# Patient Record
Sex: MALE | Race: WHITE | NOT HISPANIC OR LATINO | Employment: OTHER | URBAN - METROPOLITAN AREA
[De-identification: names, ages, dates, MRNs, and addresses within clinical notes are randomized per-mention and may not be internally consistent; named-entity substitution may affect disease eponyms.]

---

## 2017-02-09 ENCOUNTER — NEW PATIENT (OUTPATIENT)
Dept: URBAN - METROPOLITAN AREA CLINIC 26 | Facility: CLINIC | Age: 77
End: 2017-02-09

## 2017-02-09 VITALS — HEART RATE: 68 BPM | DIASTOLIC BLOOD PRESSURE: 68 MMHG | SYSTOLIC BLOOD PRESSURE: 124 MMHG | HEIGHT: 60 IN

## 2017-02-09 DIAGNOSIS — H40.1110: ICD-10-CM

## 2017-02-09 DIAGNOSIS — H40.012: ICD-10-CM

## 2017-02-09 DIAGNOSIS — H43.812: ICD-10-CM

## 2017-02-09 DIAGNOSIS — H33.001: ICD-10-CM

## 2017-02-09 DIAGNOSIS — H35.341: ICD-10-CM

## 2017-02-09 DIAGNOSIS — H35.373: ICD-10-CM

## 2017-02-09 PROCEDURE — 99204 OFFICE O/P NEW MOD 45 MIN: CPT

## 2017-02-09 PROCEDURE — 92225 OPHTHALMOSCOPY (INITIAL): CPT

## 2017-02-09 PROCEDURE — 1036F TOBACCO NON-USER: CPT

## 2017-02-09 PROCEDURE — 92134 CPTRZ OPH DX IMG PST SGM RTA: CPT

## 2017-02-09 PROCEDURE — G8427 DOCREV CUR MEDS BY ELIG CLIN: HCPCS

## 2017-02-09 PROCEDURE — 2027F OPTIC NERVE HEAD EVAL DONE: CPT

## 2017-02-09 ASSESSMENT — VISUAL ACUITY
OD_SC: 20/200
OS_SC: 20/25-2
OS_CC: 20/25-2
OD_CC: 20/80-2

## 2017-02-09 ASSESSMENT — TONOMETRY
OS_IOP_MMHG: 13
OD_IOP_MMHG: 12

## 2020-01-29 NOTE — PATIENT DISCUSSION
This visual field clearly demonstrated a minimum of 38% loss of upper field of vision OU, with upper lid skin in repose and elevated by taping of the lid to demonstrate potential correction. This field shows that taping the lids significantly improved this patient's superior field of vision by approximately 30%, OU.

## 2021-01-11 NOTE — PROCEDURE NOTE: SURGICAL
"<span style=""font-weight: bold;"">MR #:&nbsp;</span> [id number] />  <br /> <span style=""font-weight: bold;""> PREOPERATIVE DIAGNOSIS:&nbsp;</span> Dermatochalasis upper lids<br />  <br /> <span style=""font-weight: bold;""> POSTOPERATIVE DIAGNOSIS:&nbsp;</span> Same<br />  <br /> <span style=""font-weight: bold;""> OPERATIVE PROCEDURE:</span>&nbsp;  Upper lid blepharoplasty with fat removal

## 2021-01-20 NOTE — PATIENT DISCUSSION
One week post op: great curve and shape, no infection, healing well, sutures intact and removed, use Skinuva BID x 1 month to incisions. Wear sunglasses and hat while outdoors. Avoid sun exposure. No restrictions in 5 days.

## 2022-07-09 ENCOUNTER — TELEPHONE ENCOUNTER (OUTPATIENT)
Dept: URBAN - METROPOLITAN AREA CLINIC 121 | Facility: CLINIC | Age: 82
End: 2022-07-09

## 2022-07-09 RX ORDER — OMEPRAZOLE 20 MG/1
CAPSULE, DELAYED RELEASE ORAL ONCE A DAY
Refills: 2 | OUTPATIENT
Start: 2016-04-26 | End: 2017-05-10

## 2022-07-09 RX ORDER — OMEPRAZOLE 20 MG/1
CAPSULE, DELAYED RELEASE ORAL
Refills: 0 | OUTPATIENT
Start: 2014-12-02 | End: 2016-04-26

## 2022-07-09 RX ORDER — OMEPRAZOLE 40 MG/1
TAKE 1 TAB PO QAM 30-60 MINS BEFORE BREAKFAST CAPSULE, DELAYED RELEASE ORAL ONCE A DAY
Refills: 0 | OUTPATIENT
Start: 2017-11-15 | End: 2018-09-18

## 2022-07-09 RX ORDER — ATORVASTATIN CALCIUM 40 MG/1
TABLET, FILM COATED ORAL ONCE A DAY
Refills: 2 | OUTPATIENT
Start: 2016-04-26 | End: 2017-05-10

## 2022-07-09 RX ORDER — MELOXICAM 15 MG/1
TABLET ORAL
Refills: 0 | OUTPATIENT
Start: 2017-05-10 | End: 2017-11-15

## 2022-07-09 RX ORDER — LISINOPRIL 40 MG/1
TABLET ORAL
Refills: 0 | OUTPATIENT
Start: 2014-10-07 | End: 2014-12-02

## 2022-07-09 RX ORDER — MELOXICAM 15 MG/1
TABLET ORAL
Refills: 0 | OUTPATIENT
Start: 2016-04-26 | End: 2017-05-10

## 2022-07-09 RX ORDER — OMEPRAZOLE 40 MG/1
TAKE 1 TAB PO QAM 30-60 MINS BEFORE BREAKFAST CAPSULE, DELAYED RELEASE ORAL ONCE A DAY
Refills: 3 | OUTPATIENT
Start: 2016-04-26 | End: 2016-04-26

## 2022-07-09 RX ORDER — OMEPRAZOLE 20 MG/1
TAKE 1 TAB PO QAM 30-60 MINS BEFORE BREAKFAST CAPSULE, DELAYED RELEASE ORAL ONCE A DAY
Refills: 3 | OUTPATIENT
Start: 2014-12-02 | End: 2017-05-10

## 2022-07-09 RX ORDER — MELOXICAM 15 MG/1
TABLET ORAL
Refills: 0 | OUTPATIENT
Start: 2014-12-02 | End: 2016-04-26

## 2022-07-09 RX ORDER — OMEPRAZOLE 40 MG/1
TAKE 1 TAB PO QAM 30-60 MINS BEFORE BREAKFAST CAPSULE, DELAYED RELEASE ORAL ONCE A DAY
Refills: 1 | OUTPATIENT
Start: 2017-05-10 | End: 2018-09-18

## 2022-07-09 RX ORDER — LISINOPRIL 40 MG/1
TABLET ORAL
Refills: 0 | OUTPATIENT
Start: 2014-12-02 | End: 2016-04-26

## 2022-07-09 RX ORDER — MELOXICAM 15 MG/1
TABLET ORAL
Refills: 0 | OUTPATIENT
Start: 2018-09-18 | End: 2018-09-18

## 2022-07-09 RX ORDER — LISINOPRIL 40 MG/1
TABLET ORAL ONCE A DAY
Refills: 2 | OUTPATIENT
Start: 2016-04-26 | End: 2017-05-10

## 2022-07-09 RX ORDER — DUTASTERIDE 0.5 MG
CAPSULE ORAL
Refills: 0 | OUTPATIENT
Start: 2013-04-01 | End: 2013-08-15

## 2022-07-09 RX ORDER — MELOXICAM 15 MG/1
TABLET ORAL
Refills: 0 | OUTPATIENT
Start: 2014-10-07 | End: 2014-12-02

## 2022-07-09 RX ORDER — LISINOPRIL 40 MG/1
TABLET ORAL
Refills: 0 | OUTPATIENT
Start: 2013-04-01 | End: 2014-10-07

## 2022-07-09 RX ORDER — ERYTHROMYCIN 5 MG/G
OINTMENT OPHTHALMIC ONCE A DAY
Refills: 0 | OUTPATIENT
Start: 2017-10-04 | End: 2017-11-15

## 2022-07-09 RX ORDER — AMLODIPINE BESYLATE 5 MG/1
TABLET ORAL
Refills: 0 | OUTPATIENT
Start: 2013-04-01 | End: 2014-10-07

## 2022-07-09 RX ORDER — LISINOPRIL 40 MG/1
TABLET ORAL
Refills: 0 | OUTPATIENT
Start: 2018-09-18 | End: 2018-11-21

## 2022-07-09 RX ORDER — AMLODIPINE BESYLATE 10 MG/1
TABLET ORAL
Refills: 0 | OUTPATIENT
Start: 2018-11-21 | End: 2018-11-21

## 2022-07-09 RX ORDER — LISINOPRIL 40 MG/1
TABLET ORAL ONCE A DAY
Refills: 2 | OUTPATIENT
Start: 2017-05-10 | End: 2017-05-10

## 2022-07-09 RX ORDER — ATORVASTATIN CALCIUM 40 MG/1
TABLET, FILM COATED ORAL ONCE A DAY
Refills: 0 | OUTPATIENT
Start: 2018-09-18 | End: 2018-11-21

## 2022-07-09 RX ORDER — MELOXICAM 15 MG/1
TABLET ORAL
Refills: 0 | OUTPATIENT
Start: 2017-11-15 | End: 2018-09-18

## 2022-07-09 RX ORDER — AMLODIPINE BESYLATE 5 MG/1
TABLET ORAL ONCE A DAY
Refills: 0 | OUTPATIENT
Start: 2017-08-28 | End: 2017-11-15

## 2022-07-09 RX ORDER — AMLODIPINE BESYLATE 5 MG/1
TABLET ORAL ONCE A DAY
Refills: 2 | OUTPATIENT
Start: 2016-04-26 | End: 2017-05-10

## 2022-07-09 RX ORDER — OMEPRAZOLE 20 MG/1
CAPSULE, DELAYED RELEASE ORAL
Refills: 0 | OUTPATIENT
Start: 2014-10-07 | End: 2014-12-02

## 2022-07-09 RX ORDER — PRAVASTATIN SODIUM 80 MG/1
TABLET ORAL
Refills: 0 | OUTPATIENT
Start: 2013-04-01 | End: 2014-10-07

## 2022-07-09 RX ORDER — ASPIRIN 81 MG/1
TABLET, DELAYED RELEASE ORAL ONCE A DAY
Refills: 0 | OUTPATIENT
Start: 2018-09-18 | End: 2018-11-21

## 2022-07-09 RX ORDER — OMEGA-3/DHA/EPA/FISH OIL 1000 MG
CAPSULE ORAL
Refills: 0 | OUTPATIENT
Start: 2013-04-01 | End: 2013-08-15

## 2022-07-09 RX ORDER — MELOXICAM 15 MG/1
TABLET ORAL
Refills: 0 | OUTPATIENT
Start: 2013-04-01 | End: 2013-05-13

## 2022-07-09 RX ORDER — OMEPRAZOLE 40 MG/1
TAKE 1 TAB PO QAM 30-60 MINS BEFORE BREAKFAST CAPSULE, DELAYED RELEASE ORAL ONCE A DAY
Refills: 3 | OUTPATIENT
Start: 2017-11-15 | End: 2017-11-15

## 2022-07-09 RX ORDER — AMLODIPINE BESYLATE 5 MG/1
TABLET ORAL
Refills: 0 | OUTPATIENT
Start: 2014-12-02 | End: 2016-04-26

## 2022-07-09 RX ORDER — ASPIRIN 81 MG/1
TABLET, COATED ORAL
Refills: 0 | OUTPATIENT
Start: 2013-04-01 | End: 2013-05-13

## 2022-07-09 RX ORDER — ATORVASTATIN CALCIUM 40 MG/1
TABLET, FILM COATED ORAL
Refills: 0 | OUTPATIENT
Start: 2014-12-02 | End: 2016-04-26

## 2022-07-09 RX ORDER — MULTIVIT-MIN/FA/LYCOPEN/LUTEIN .4-300-25
TABLET ORAL
Refills: 0 | OUTPATIENT
Start: 2013-04-01 | End: 2014-10-07

## 2022-07-09 RX ORDER — AMLODIPINE BESYLATE 5 MG/1
TABLET ORAL
Refills: 0 | OUTPATIENT
Start: 2014-10-07 | End: 2014-12-02

## 2022-07-09 RX ORDER — ESOMEPRAZOLE MAGNESIUM 40 MG
TAKE 1 TAB PO QAM 30-60 BEFORE BREAKFAST CAPSULE,DELAYED RELEASE (ENTERIC COATED) ORAL ONCE A DAY
Refills: 6 | OUTPATIENT
Start: 2014-10-28 | End: 2017-05-10

## 2022-07-09 RX ORDER — ASPIRIN 81 MG/1
TABLET, DELAYED RELEASE ORAL ONCE A DAY
Refills: 0 | OUTPATIENT
Start: 2018-09-18 | End: 2018-09-18

## 2022-07-09 RX ORDER — ATORVASTATIN CALCIUM 40 MG/1
TABLET, FILM COATED ORAL
Refills: 0 | OUTPATIENT
Start: 2014-10-07 | End: 2014-12-02

## 2022-07-09 RX ORDER — OMEPRAZOLE 40 MG/1
TAKE 1 TAB PO QAM 30-60 MINS BEFORE BREAKFAST CAPSULE, DELAYED RELEASE ORAL ONCE A DAY
Refills: 3 | OUTPATIENT
Start: 2016-04-26 | End: 2017-05-10

## 2022-07-09 RX ORDER — ATORVASTATIN CALCIUM 40 MG/1
TABLET, FILM COATED ORAL ONCE A DAY
Refills: 0 | OUTPATIENT
Start: 2017-10-09 | End: 2018-09-18

## 2022-07-09 RX ORDER — ASPIRIN 81 MG/1
TABLET, DELAYED RELEASE ORAL ONCE A DAY
Refills: 0 | OUTPATIENT
Start: 2017-05-10 | End: 2017-11-15

## 2022-07-09 RX ORDER — LISINOPRIL 40 MG/1
TABLET ORAL ONCE A DAY
Refills: 0 | OUTPATIENT
Start: 2017-09-30 | End: 2017-11-15

## 2022-07-09 RX ORDER — ASPIRIN 81 MG/1
TABLET, DELAYED RELEASE ORAL ONCE A DAY
Refills: 0 | OUTPATIENT
Start: 2017-11-15 | End: 2018-09-18

## 2022-07-09 RX ORDER — AMLODIPINE BESYLATE 10 MG/1
TABLET ORAL
Refills: 0 | OUTPATIENT
Start: 2018-09-18 | End: 2018-11-21

## 2022-07-10 ENCOUNTER — TELEPHONE ENCOUNTER (OUTPATIENT)
Dept: URBAN - METROPOLITAN AREA CLINIC 121 | Facility: CLINIC | Age: 82
End: 2022-07-10

## 2022-07-10 RX ORDER — AMLODIPINE BESYLATE 5 MG/1
TABLET ORAL ONCE A DAY
Refills: 0 | Status: ACTIVE | COMMUNITY
Start: 2017-11-15

## 2022-07-10 RX ORDER — ASPIRIN 81 MG/1
TABLET, DELAYED RELEASE ORAL ONCE A DAY
Refills: 0 | Status: ACTIVE | COMMUNITY
Start: 2018-11-21

## 2022-07-10 RX ORDER — LISINOPRIL 40 MG/1
TABLET ORAL
Refills: 0 | Status: ACTIVE | COMMUNITY
Start: 2018-11-21

## 2022-07-10 RX ORDER — SUCRALFATE 1 G/10ML
TAKE 10ML PO QID - AC & HS SUSPENSION ORAL
Refills: 1 | Status: ACTIVE | COMMUNITY
Start: 2013-05-13

## 2022-07-10 RX ORDER — OMEPRAZOLE 20 MG/1
CAPSULE, DELAYED RELEASE ORAL ONCE A DAY
Refills: 2 | Status: ACTIVE | COMMUNITY
Start: 2017-05-10

## 2022-07-10 RX ORDER — OMEPRAZOLE 20 MG/1
TAKE ONE TAB PO DAILY 30-60 MINUTES BEFORE BREAKFAST TABLET, DELAYED RELEASE ORAL ONCE A DAY
Refills: 2 | Status: ACTIVE | COMMUNITY
Start: 2013-11-11

## 2022-07-10 RX ORDER — OMEPRAZOLE 40 MG/1
TAKE 1 TAB PO QAM 30-60 MINS BEFORE BREAKFAST CAPSULE, DELAYED RELEASE ORAL ONCE A DAY
Refills: 3 | Status: ACTIVE | COMMUNITY
Start: 2017-11-15

## 2022-07-10 RX ORDER — LISINOPRIL 40 MG/1
TABLET ORAL ONCE A DAY
Refills: 2 | Status: ACTIVE | COMMUNITY
Start: 2017-05-10

## 2022-07-10 RX ORDER — FLUCONAZOLE 100 MG/1
TAKE 2 TABS PO TODAY, THEN 1 TAB DAILY TILL COMPLETE TABLET ORAL TAKE AS DIRECTED
Refills: 0 | Status: ACTIVE | COMMUNITY
Start: 2013-05-13

## 2022-07-10 RX ORDER — LISINOPRIL 40 MG/1
TABLET ORAL ONCE A DAY
Refills: 0 | Status: ACTIVE | COMMUNITY
Start: 2017-11-15

## 2022-07-10 RX ORDER — ATORVASTATIN CALCIUM 40 MG/1
TABLET, FILM COATED ORAL ONCE A DAY
Refills: 0 | Status: ACTIVE | COMMUNITY
Start: 2018-11-21

## 2022-07-10 RX ORDER — AMLODIPINE BESYLATE 10 MG/1
TABLET ORAL
Refills: 0 | Status: ACTIVE | COMMUNITY
Start: 2018-11-21

## 2022-07-10 RX ORDER — OMEPRAZOLE 20 MG/1
TAKE 1 TAB PO DAILY QAM 30-60 MINS BEFORE BREAKFAST CAPSULE, DELAYED RELEASE ORAL ONCE A DAY
Refills: 3 | Status: ACTIVE | COMMUNITY
Start: 2013-08-15

## 2022-07-10 RX ORDER — ATORVASTATIN CALCIUM 40 MG/1
TABLET, FILM COATED ORAL ONCE A DAY
Refills: 2 | Status: ACTIVE | COMMUNITY
Start: 2017-05-10

## 2022-07-10 RX ORDER — AMLODIPINE BESYLATE 5 MG/1
TABLET ORAL ONCE A DAY
Refills: 2 | Status: ACTIVE | COMMUNITY
Start: 2017-05-10

## 2022-07-10 RX ORDER — OMEPRAZOLE 40 MG/1
TAKE 1 TAB PO QAM 30-60 MINS BEFORE BREAKFAST CAPSULE, DELAYED RELEASE ORAL ONCE A DAY
Refills: 2 | Status: ACTIVE | COMMUNITY
Start: 2013-05-13